# Patient Record
Sex: FEMALE | Race: WHITE
[De-identification: names, ages, dates, MRNs, and addresses within clinical notes are randomized per-mention and may not be internally consistent; named-entity substitution may affect disease eponyms.]

---

## 2017-02-27 NOTE — PHYS DOC
Past Medical History


Past Medical History:  Depression


Additional Past Medical Histor:  VIRAL MENINGITIS, CERVICAL HERNIATED DISC,


Past Surgical History:  , Tonsillectomy


Additional Information:  


Nonsmoker


Alcohol Use:  Rarely


Drug Use:  None





Adult General


Chief Complaint


Chief Complaint:  TOE PROBLEM





HPI


HPI


Patient is a 57  year old female who presents with left fifth toe pain after 

stubbing the toe on a board yesterday. She has been ambulatory. She states it 

is very painful to put her shoe on the foot. Her PCP is Dr. Lashay Cummins.





Review of Systems


Review of Systems


Constitutional: Denies fever or chills. []


Musculoskeletal: Denies back pain or joint pain. Reports left fifth toe pain.


Integument: Denies rash or skin lesions. Reports left fifth toe ecchymosis.


Neurologic: Denies headache, focal weakness or sensory changes. []





Allergies


Allergies





 Allergies








Coded Allergies Type Severity Reaction Last Updated Verified


 


  No Known Drug Allergies    7/31/15 No











Physical Exam


Physical Exam





Constitutional: Well developed, well nourished, no acute distress, non-toxic 

appearance. []


HENT: Normocephalic, atraumatic, oropharynx moist. []


Eyes: PERRLA, EOMI, conjunctiva normal, no discharge. [] 


Skin: Warm, dry, no erythema, no rash. Left fifth toe ecchymosis.


Extremities: Left fifth toe tenderness, ROM intact, no edema. 2+ DP pulse. Less 

than 2 second capillary refill distally. Light touch sensation intact distally. 

There is no metatarsal tenderness.


Neurologic: Alert and oriented X 3, normal motor function, normal sensory 

function, no focal deficits noted. []


Psychologic: Affect normal, judgement normal, mood normal. []





Current Patient Data


Vital Signs





 Vital Signs








  Date Time  Temp Pulse Resp B/P Pulse Ox O2 Delivery O2 Flow Rate FiO2


 


17 19:01 98.1 66 18  100 Room Air  





 98.1       











EKG


EKG


[]





Radiology/Procedures


Radiology/Procedures


There is a fracture of the proximal phalanx of the left fifth toe. Three-view x-

ray of the left foot interpreted by myself.





Course & Med Decision Making


Course & Med Decision Making


Pertinent Labs and Imaging studies reviewed. (See chart for details)





Patient presents with left fifth toe pain after stepping on a board. On exam, 

she is neurovascularly intact without evidence of dislocation. X-ray shows a 

fracture of the left fifth toe proximal phalanx. She is discharged with a 

postop shoe. She declines offer for pain medication. She is given a work note 

for 2 days. Information for orthopedics for follow-up. Return precautions were 

discussed. She verbalizes understanding and agrees with plan.





Dragon Disclaimer


Dragon Disclaimer


This electronic medical record was generated, in whole or in part, using a 

voice recognition dictation system.





Departure


Departure


Impression:  


 Primary Impression:  


 Toe fracture, left


Disposition:  01 HOME, SELF-CARE


Condition:  STABLE


Referrals:  


LASHAY CUMMINS MD (PCP)








BALTAZAR KAISER II, MD


Patient Instructions:  Toe Fracture, Easy-to-Read





Additional Instructions:


You were seen for a broken toe. Please wear the provided postop shoe for 

comfort with walking.


Please follow-up with the orthopedic doctor listed below if your pain continues 

or have other complications.


Return to emergency department if you have any new or concerning symptoms.





Problem Qualifiers








 Primary Impression:  


 Toe fracture, left


 Encounter type:  initial encounter  Toe:  lesser toe  Fracture type:  closed  

Phalanx:  proximal  Fracture alignment:  nondisplaced  Qualified Code:  

S92.515A - Nondisplaced fracture of proximal phalanx of left lesser toe(s), 

initial encounter for closed fracture





JOYCE HOPPER 2017 20:07

## 2017-02-28 NOTE — RAD
Left little toe, 3 views, 2/27/2017:



History: Injury



There is a fracture of the proximal aspect of the proximal phalanx of the

little toe. The fracture is slightly comminuted with a fracture line involving

its articular surface. There is slight impaction at the fracture site. No

other fracture or dislocation is identified.





IMPRESSION: Acute fracture of the proximal phalanx of left little toe.

## 2017-08-09 ENCOUNTER — HOSPITAL ENCOUNTER (EMERGENCY)
Dept: HOSPITAL 61 - ER | Age: 57
Discharge: HOME | End: 2017-08-09
Payer: COMMERCIAL

## 2017-08-09 VITALS
DIASTOLIC BLOOD PRESSURE: 63 MMHG | DIASTOLIC BLOOD PRESSURE: 63 MMHG | DIASTOLIC BLOOD PRESSURE: 63 MMHG | SYSTOLIC BLOOD PRESSURE: 139 MMHG | SYSTOLIC BLOOD PRESSURE: 139 MMHG | SYSTOLIC BLOOD PRESSURE: 139 MMHG

## 2017-08-09 DIAGNOSIS — R05: Primary | ICD-10-CM

## 2017-08-09 DIAGNOSIS — F32.9: ICD-10-CM

## 2017-08-09 DIAGNOSIS — R11.0: ICD-10-CM

## 2017-08-09 LAB
ALBUMIN SERPL-MCNC: 3.6 G/DL (ref 3.4–5)
ALBUMIN/GLOB SERPL: 1 {RATIO} (ref 1–1.7)
ALP SERPL-CCNC: 90 U/L (ref 46–116)
ALT SERPL-CCNC: 27 U/L (ref 14–59)
ANION GAP SERPL CALC-SCNC: 10 MMOL/L (ref 6–14)
AST SERPL-CCNC: 15 U/L (ref 15–37)
BASOPHILS # BLD AUTO: 0 X10^3/UL (ref 0–0.2)
BASOPHILS NFR BLD: 0 % (ref 0–3)
BILIRUB SERPL-MCNC: 1.1 MG/DL (ref 0.2–1)
BUN SERPL-MCNC: 10 MG/DL (ref 7–20)
BUN/CREAT SERPL: 13 (ref 6–20)
CALCIUM SERPL-MCNC: 8.3 MG/DL (ref 8.5–10.1)
CHLORIDE SERPL-SCNC: 102 MMOL/L (ref 98–107)
CO2 SERPL-SCNC: 24 MMOL/L (ref 21–32)
CREAT SERPL-MCNC: 0.8 MG/DL (ref 0.6–1)
EOSINOPHIL NFR BLD: 1 % (ref 0–3)
ERYTHROCYTE [DISTWIDTH] IN BLOOD BY AUTOMATED COUNT: 13.8 % (ref 11.5–14.5)
GFR SERPLBLD BASED ON 1.73 SQ M-ARVRAT: 73.4 ML/MIN
GLOBULIN SER-MCNC: 3.7 G/DL (ref 2.2–3.8)
GLUCOSE SERPL-MCNC: 120 MG/DL (ref 70–99)
HCT VFR BLD CALC: 38.6 % (ref 36–47)
HGB BLD-MCNC: 12.8 G/DL (ref 12–15.5)
LYMPHOCYTES # BLD: 1.9 X10^3/UL (ref 1–4.8)
LYMPHOCYTES NFR BLD AUTO: 16 % (ref 24–48)
MCH RBC QN AUTO: 28 PG (ref 25–35)
MCHC RBC AUTO-ENTMCNC: 33 G/DL (ref 31–37)
MCV RBC AUTO: 85 FL (ref 79–100)
MONOCYTES NFR BLD: 6 % (ref 0–9)
NEUTROPHILS NFR BLD AUTO: 77 % (ref 31–73)
PLATELET # BLD AUTO: 171 X10^3/UL (ref 140–400)
POTASSIUM SERPL-SCNC: 3.6 MMOL/L (ref 3.5–5.1)
PROT SERPL-MCNC: 7.3 G/DL (ref 6.4–8.2)
RBC # BLD AUTO: 4.55 X10^6/UL (ref 3.5–5.4)
SODIUM SERPL-SCNC: 136 MMOL/L (ref 136–145)
WBC # BLD AUTO: 12.1 X10^3/UL (ref 4–11)

## 2017-08-09 PROCEDURE — 99285 EMERGENCY DEPT VISIT HI MDM: CPT

## 2017-08-09 PROCEDURE — 96374 THER/PROPH/DIAG INJ IV PUSH: CPT

## 2017-08-09 PROCEDURE — 93005 ELECTROCARDIOGRAM TRACING: CPT

## 2017-08-09 PROCEDURE — 94640 AIRWAY INHALATION TREATMENT: CPT

## 2017-08-09 PROCEDURE — 83880 ASSAY OF NATRIURETIC PEPTIDE: CPT

## 2017-08-09 PROCEDURE — 80053 COMPREHEN METABOLIC PANEL: CPT

## 2017-08-09 PROCEDURE — 71020: CPT

## 2017-08-09 PROCEDURE — 94250: CPT

## 2017-08-09 PROCEDURE — 85027 COMPLETE CBC AUTOMATED: CPT

## 2017-08-09 PROCEDURE — 36415 COLL VENOUS BLD VENIPUNCTURE: CPT

## 2017-08-09 NOTE — PHYS DOC
Past Medical History


Past Medical History:  Depression


Additional Past Medical Histor:  VIRAL MENINGITIS, CERVICAL HERNIATED DISC,


Past Surgical History:  , Tonsillectomy


Alcohol Use:  Rarely


Drug Use:  None





Adult General


Chief Complaint


Chief Complaint:  COUGH





HPI


HPI





Patient is a 57  year old female with a complaint of cough for approximately 3 

weeks now. Patient portion of the history depression. Patient is not on any 

medication. Patient denies any history of hypertension diabetes liver longer 

kidney problems. Patient has had no surgeries in the past. Patient does not 

smoke drink or do any drugs. Patient reports a history of viral meningitis. 

Patient has a adverse reaction to Zithromax. Patient is unsure when her last 

tetanus shot/pertussis shot was. Patient denies any fevers shakes chills. 

Patient reports her temperature today was 99.6 which is unusually high for her. 

Patient does complain of some nausea. Patient denies any abdominal pain dysuria 

frequency urgency. Patient reports her cough is nonproductive. Patient reports 

that she has pain with coughing. Seen her physician as of yet. She denies any 

dysuria frequency or urgency.








Review of systems:





Constitutional:  fever andr chills []


Eyes: Denies change in visual acuity, redness, or eye pain []


All other review systems are negative except as documented in the history of 

present illness portion.





Physical exam:





Constitutional: Well developed, well nourished, no acute distress, non-toxic 

appearance. []


HENT: Normocephalic, atraumatic, bilateral external ears normal, oropharynx 

moist, no oral exudates, nose normal. []


Eyes: PERRLA, EOMI, conjunctiva normal, no discharge. [] 


Neck: Normal range of motion, no tenderness, supple, no stridor. [] 


Cardiovascular:Heart rate regular rhythm, 


Lungs & Thorax:  Bilateral breath sounds clear to auscultation []


Abdomen: Bowel sounds normal, soft, no tenderness, no masses, no pulsatile 

masses. [] 


Skin: Warm, dry, no erythema, no rash. [] 


Back: No tenderness, no CVA tenderness. [] 


Extremities: No tenderness, no cyanosis, no clubbing, ROM intact, no edema. [] 


Neurologic: Alert and oriented X 3, normal motor function, normal sensory 

function, no focal deficits noted. []


Psychologic: Affect normal, judgement normal, mood normal. []








Patient's physical exam was unremarkable. Patient's and expiratory wheezing. 

Patient's oropharynx is clear. Patient's TMs were normal. Patient had no lower 

some edema. Patient no other significant abnormalities on exam.





Patient's ER workup was significant for a normal chest x-ray. Patient did 

receive an albuterol treatment here was minimal relief. Patient does have an 

elevated white count.





Assessment and plan


Cough 3 weeks. I did discuss with the patient that she actually needs to see 

her primary care physician for further evaluation of her chronic cough. Given 

her elevated white count and her persistent cough for 3 weeks we will initiate 

amoxicillin to see if that might help. Patient also be given a prescription for 

prednisone in case it's allergic reaction as well as Phenergan with codeine. 

Patient is requesting a note for work for tomorrow because she feels too 

fatigued to her work. Patient will be discharged home in stable condition with 

strict instructions to follow-up as an outpatient.





Current Medications


Current Medications





Current Medications








 Medications


  (Trade)  Dose


 Ordered  Sig/Rosalba  Start Time


 Stop Time Status Last Admin


Dose Admin


 


 Albuterol/


 Ipratropium


  (Duoneb)  3 ml  1X  ONCE  17 20:30


 17 20:31 DC 17 20:30


3 ML


 


 Methylprednisolone


 Sodium Succinate


  (SOLU-Medrol


 125MG VIAL)  125 mg  1X  ONCE  17 20:30


 17 20:31 DC 17 20:41


125 MG











Allergies


Allergies





Allergies








Coded Allergies Type Severity Reaction Last Updated Verified


 


  No Known Drug Allergies    7/31/15 No











Current Patient Data


Vital Signs





 Vital Signs








  Date Time  Temp Pulse Resp B/P (MAP) Pulse Ox O2 Delivery O2 Flow Rate FiO2


 


17 20:44  97 18 139/63 (88) 97 Room Air  


 


17 20:07 99.6       





 99.6       








Lab Values





 Laboratory Tests








Test


  17


20:40


 


White Blood Count


  12.1 x10^3/uL


(4.0-11.0)  H


 


Red Blood Count


  4.55 x10^6/uL


(3.50-5.40)


 


Hemoglobin


  12.8 g/dL


(12.0-15.5)


 


Hematocrit


  38.6 %


(36.0-47.0)


 


Mean Corpuscular Volume


  85 fL ()


 


 


Mean Corpuscular Hemoglobin 28 pg (25-35)  


 


Mean Corpuscular Hemoglobin


Concent 33 g/dL


(31-37)


 


Red Cell Distribution Width


  13.8 %


(11.5-14.5)


 


Platelet Count


  171 x10^3/uL


(140-400)


 


Neutrophils (%) (Auto) 77 % (31-73)  H


 


Lymphocytes (%) (Auto) 16 % (24-48)  L


 


Monocytes (%) (Auto) 6 % (0-9)  


 


Eosinophils (%) (Auto) 1 % (0-3)  


 


Basophils (%) (Auto) 0 % (0-3)  


 


Neutrophils # (Auto)


  9.2 x10^3uL


(1.8-7.7)  H


 


Lymphocytes # (Auto)


  1.9 x10^3/uL


(1.0-4.8)


 


Monocytes # (Auto)


  0.8 x10^3/uL


(0.0-1.1)


 


Eosinophils # (Auto)


  0.1 x10^3/uL


(0.0-0.7)


 


Basophils # (Auto)


  0.0 x10^3/uL


(0.0-0.2)


 


Sodium Level


  136 mmol/L


(136-145)


 


Potassium Level


  3.6 mmol/L


(3.5-5.1)


 


Chloride Level


  102 mmol/L


()


 


Carbon Dioxide Level


  24 mmol/L


(21-32)


 


Anion Gap 10 (6-14)  


 


Blood Urea Nitrogen


  10 mg/dL


(7-20)


 


Creatinine


  0.8 mg/dL


(0.6-1.0)


 


Estimated GFR


(Cockcroft-Gault) 73.4  


 


 


BUN/Creatinine Ratio 13 (6-20)  


 


Glucose Level


  120 mg/dL


(70-99)  H


 


Calcium Level


  8.3 mg/dL


(8.5-10.1)  L


 


Total Bilirubin


  1.1 mg/dL


(0.2-1.0)  H


 


Aspartate Amino Transferase


(AST) 15 U/L (15-37)


 


 


Alanine Aminotransferase (ALT)


  27 U/L (14-59)


 


 


Alkaline Phosphatase


  90 U/L


()


 


NT-Pro-B-Type Natriuretic


Peptide 73 pg/mL


(0-124)


 


Total Protein


  7.3 g/dL


(6.4-8.2)


 


Albumin


  3.6 g/dL


(3.4-5.0)


 


Albumin/Globulin Ratio 1.0 (1.0-1.7)  





 Laboratory Tests


17 20:40








 Laboratory Tests


17 20:40














EKG


EKG


[] EKG reveals normal sinus rhythm at 91 with nonspecific ST-T wave 

abnormalities no evidence of STEMI. Normal intervals.  by ER 

physician.





Radiology/Procedures


Radiology/Procedures


[] Chest x-ray reveals normal heart size. No infiltrates no effusions no 

pneumothorax no evidence of pneumonia. Interpreted by the ER physician.





Course & Med Decision Making


Course & Med Decision Making


Pertinent Labs and Imaging studies reviewed. (See chart for details)





[]





Dragon Disclaimer


Dragon Disclaimer


This electronic medical record was generated, in whole or in part, using a 

voice recognition dictation system.





Departure


Departure


Impression:  


 Primary Impression:  


 Cough


Disposition:  01 HOME, SELF-CARE


Condition:  IMPROVED


Referrals:  


LASHAY CUMMINS MD (PCP)


Patient Instructions:  Cough, Adult





Additional Instructions:  


Please make sure you make an appointment to see her family doctor. You have had 

a cough now for approximately 3 weeks which is unusual. Things that need to be 

ruled out include asthma, allergies, cancer, and other causes that can be 

further explored by a specialist.


Scripts


Promethazine HCl/Codeine (Prometh-Codein 6.25-10 mg/5 ml) 5 Ml Syrup


10 ML PO Q6HRS Y for COUGH, #120


   Prov: KENDRICK PAYTON MD         17 


Prednisone (PREDNISONE) 50 Mg Tablet


1 TAB PO DAILY, #5 TAB


   Prov: KENDRICK PAYTON MD         17 


Amoxicillin (AMOXICILLIN) 500 Mg Capsule


1 CAP PO TID, #30 CAP


   Prov: KENDRICK PAYTON MD         17











KENDRICK PAYTON MD Aug 9, 2017 21:31

## 2017-08-10 NOTE — EKG
[facility]

              [address] [place] [address]

Test Date:    2017               Test Time:    20:25:13

Pat Name:     ALEXANDRIA WOOTEN            Department:   

Patient ID:   [id number]           Room:          

Gender:       F                        Technician:   

:          [id number]               Requested By: KENDRICK PAYTON

Order Number: [id number]            Reading MD:     

                                 Measurements

Intervals                              Axis          

Rate:         91                       P:            15

UT:           110                      QRS:          38

QRSD:         94                       T:            31

QT:           342                                    

QTc:          422                                    

                           Interpretive Statements

SINUS RHYTHM

LEFT ATRIAL ABNORMALITY

ABNORMAL ECG

RI6.01

No previous ECG available for comparison

## 2017-08-10 NOTE — RAD
Indication cough for 2 weeks.



Frontal and lateral views of the chest were obtained. Comparison is made to an

exam 10/13/2016.



The heart and pulmonary vessels appear normal. The lungs are clear. There is

no pleural fluid or pneumothorax. There has not been a significant change

compared to the previous exam.



IMPRESSION: No acute or focal process. No significant change

## 2020-02-03 ENCOUNTER — HOSPITAL ENCOUNTER (OUTPATIENT)
Dept: HOSPITAL 61 - KCIC | Age: 60
Discharge: HOME | End: 2020-02-03
Attending: FAMILY MEDICINE
Payer: COMMERCIAL

## 2020-02-03 DIAGNOSIS — M25.812: Primary | ICD-10-CM

## 2020-02-03 DIAGNOSIS — M75.82: ICD-10-CM

## 2020-02-03 PROCEDURE — 73030 X-RAY EXAM OF SHOULDER: CPT

## 2020-02-03 NOTE — KCIC
PROCEDURE: SHOULDER 2+V LEFT

 

STUDY DATE: 2/3/2020

 

CLINICAL INDICATION / HISTORY: Left rotator cuff tendinitis.

 

TECHNIQUE: AP internal and external rotation views with a Y- view were 

obtained.

 

COMPARISON: None

 

FINDINGS: No fracture, dislocation or bone destruction is identified. 

There are mild degenerative changes at the left AC joint. No 

calcifications are seen in relation to the rotator cuff insertion. There 

is mild joint space narrowing in the subacromial space. 

 

IMPRESSION:  Mild acromioclavicular degenerative changes of the left 

shoulder and joint space narrowing on the subacromial space suggesting 

rotator cuff pathology. This could be better evaluated with MRI if 

clinically warranted.

 

Electronically signed by: Jaqueline Pete MD (2/3/2020 3:19 PM) 

[facility]

## 2020-10-30 ENCOUNTER — HOSPITAL ENCOUNTER (OUTPATIENT)
Dept: HOSPITAL 61 - KCIC | Age: 60
End: 2020-10-30
Attending: FAMILY MEDICINE
Payer: COMMERCIAL

## 2020-10-30 DIAGNOSIS — R14.2: Primary | ICD-10-CM

## 2020-10-30 DIAGNOSIS — R10.9: ICD-10-CM

## 2020-10-30 PROCEDURE — 74021 RADEX ABDOMEN 3+ VIEWS: CPT

## 2020-10-30 NOTE — KCIC
EXAM: Abdomen, 2 views.

 

HISTORY: Pain. Belching.

 

COMPARISON: None.

 

FINDINGS: Frontal upright and supine views of the abdomen are obtained. 

There is moderate proximal and mid colonic stool. There is no evidence of 

bowel structure in. There is no free air.

 

IMPRESSION: 

1. Moderate proximal and mid colonic stool.

2. Nonobstructive bowel gas pattern.

 

Electronically signed by: Sheri Stone MD (10/30/2020 12:30 PM) 

[id number]